# Patient Record
(demographics unavailable — no encounter records)

---

## 2024-10-16 NOTE — ASSESSMENT
[FreeTextEntry1] : Norm is a 15 y/o male here for a follow up visit s/p seen 09/23/24 and starting on Concerta, dx with ADHD, predominantly inattentive presentation 07/11/24. He has a very complex medical history including a rare genetic condition MMA (methylmalonic aciduria), developmental delays, intellectual disability, idiopathic short stature (previously on GH) , s/p liver and kidney transplants in Nov 2019 at [facility]. hypertension (resolved) and diabetes mellitus (steroid induced, resolved). He was cleared for ADHD medication by his nephrologist Dr Dequan Vieira/Audrey Reyes at [facility].  As per specialist recommendation, patient should be followed by their team monthly so that they can appropriately adjust his BP medication if he develops hypertension.

## 2024-10-16 NOTE — DATA REVIEWED
[FreeTextEntry1] : Clearance Letter from 06/27/24 Dr Dequan Vieira/Dr Audrey Reyes [facility]. Patient cleared for all stimulants and ADHD medication.  He is to be followed monthly by their team to monitor his BP and adjust BP meds if he develops hypertension.

## 2024-10-16 NOTE — HISTORY OF PRESENT ILLNESS
[FreeTextEntry1] : Norm is a 15 y/o male here for a follow up visit s/p seen 24 and starting on Concerta, dx with ADHD, predominantly inattentive presentation 24. He has a very complex medical history including a rare genetic condition MMA (methylmalonic aciduria), developmental delays, intellectual disability, idiopathic short stature (previously on GH) , s/p liver and kidney transplants in 2019 at [facility]. hypertension (resolved) and diabetes mellitus (steroid induced, resolved). He was cleared for ADHD medication by his nephrologist Dr Dequan Vieira/Audrey Reyes at [facility].  As per specialist recommendation, patient should be followed by their team monthly so that they can appropriately adjust his BP medication if he develops hypertension.    PLAN FROM PREVIOUS VISIT -Start Concerta 18 mg -Psychoeducation provided re: ADHD.  Resources for education given -Discussed management for ADHD including behavioral interventions and medication options. -Counseling on ADHD medication; stimulants are 1st line treatment. Discussed stimulant medications; risks, benefits, possible side effects. -Reviewed close monitoring for BP and HR changes (mother has medical BP cuff at home and instructions to call nephrologist once monthly and as needed if BP elevated.  -Letter for ADHD medication initiation provided for [facility] specialists.   -Follow up in 1 month (needs ).   INTERIM HPI   HPI FROM VISIT ON 24  Norm is a 15 y/o male here for a follow up visit s/p diagnosed with ADHD, predominantly inattentive presentation 24. He has a very complex medical history including a rare genetic condition MMA (methylmalonic aciduria), developmental delays, intellectual disability, idiopathic short stature (previously on GH) , s/p liver and kidney transplants in 2019 at [facility]. hypertension (resolved) and diabetes mellitus (steroid induced, resolved). Letter was provided for his nephrologist/genetic team at [facility] for clearance for stimulant.  PLAN FROM PREVIOUS VISIT -Medication clearance requested from [facility] team; Letter provided,   -Psychoeducation provided re: ADHD.  Resources for education given -Counseling on ADHD medication; stimulants are 1st line treatment. Discussed stimulant medications; risks, benefits, possible side effects. -Will send Rx for Concerta 18 mg if able to obtain clearance.  -Follow up within 1 month of starting stimulant.   INTERIM HPI  Started 10th grade, self-contained classroom. Letter from [facility] specialist received (scanned to EHR 24) Clearance Letter from 24 Dr Dequan Vieira/Dr Audrey Reyes [facility]. Patient cleared for all stimulants and ADHD medication.  He is to be followed monthly by their team to monitor his BP and adjust BP meds if he develops hypertension.  HPI today: Mother endorses he had recent blood work on 10/17/24.  Discussed BP monitoring needed on stimulant.  Mother reports she had a BP monitor at home that was prescribed by her specialist, and she has been told to call them when BP is increased.     HPI FROM VISIT ON 24  Norm is a 15 y/o male here for a follow up visit s/p diagnosed with ADHD, predominantly inattentive presentation 24.  Medication clearance by specialist requested as he has a very complex medical history including a rare genetic condition MMA (methylmalonic aciduria), developmental delays, intellectual disability, idiopathic short stature (previously on GH) , s/p liver and kidney transplants in 2019 at [facility]. hypertension (resolved) and diabetes mellitus (steroid induced, resolved).   Patient is enrolled in 10th grade (in the fall) in a special education program has an IEP and is receiving OT, PT and ST.     Letter was provided for his nephrologist Dr Corinne Benchimol.    PLAN FROM PREVIOUS VISIT -Medication clearance requested from [facility] team; Letter provided for Dr Melida Lanza.   -Psychoeducation provided re: ADHD.  Resources for education given -Discussed management for ADHD including behavioral interventions and medication options. -Counseling on ADHD medication; stimulants are 1st line treatment. Discussed stimulant medications; risks, benefits, possible side effects. -Follow up in 1 month  INTERIM HPI  services used.   Mother reports she did not get messages left by offices; likely father did not let her know. Norm has a follow up on  at [facility] with his specialist.   She would like to try ADHD medication and asking appropriate questions about dosing, time to give, possible side effects, etc.   He will be starting 10 th grade at [employer]- (summer in a different location)  -Special Ed classroom- IEP, \[employer] [facility]\"" education classroom 10:2:1 -  Classification: Medical conditions, learning disability. - General education classroom - Special Ed/educational services: Currently receiving ST, OT, PT.      HPI FROM VISIT ON 24  Norm is a 15 y/o male here for a follow up ADHD evaluation s/p seen 24 for inattention and behavioral concerns.  He has a very complex medical history including a rare genetic condition MMA (methylmalonic aciduria), developmental delays, intellectual disability, idiopathic short stature (previously on GH) , s/p liver and kidney transplants in 2019 at [facility]. hypertension (resolved) and diabetes mellitus (steroid induced, resolved).   Patient attends 9th grade in a special education program has an IEP and is receiving OT, PT and ST.  On PE he was verbal and cooperative, developmentally delayed, very fidgety and unable to stay seated unless watching a video. Will proceed with ADHD evaluation.  Discussed with mother that ADHD medication, if indicated, would need to be cleared by his [facility] specialists.   PLAN FROM PREVIOUS VISIT -Psychoeducation provided regarding possible ADHD diagnosis. -[place] parent and teacher forms to be completed prior to next visit ([place] Parent form) -Family asked to provide school report cards/progress reports -Family to provide results from previous neuropsychological testing. and current or past IEP forms. -Discussed medication option if indicated, would need to be cleared by [facility] specialists, possible contraindications include liver and kidney transplants.  -Follow up in 1month   INTERIM HPI  Mother cancelled prior follow up visit as he had a class trip.   [place] forms reviewed with  mother.  Mother would like to try ADHD medication.   [place] SCALES-   PARENT ([place] form)-mother Inattention: 8/9+ Hyperactivity: 9/9 + ODD:   6/8 + CD:  0/14- Anxiety/Depression:  4/7+ IMPAIRMENTS (score of 4 or 5)   Academic overall performance: No (score of 3= average)    Reading YES (5= PROBLEMATIC)    Writing YES (4= SOMEWHAT OF A PROBLEM)    Math: YES (4= SOMEWHAT OF A PROBLEM)   Relationships with parents: No (score of 3= average)                                 siblings: No (score of 3= average)                                 peers: No (score of 3= average)                                 organized activities/teams: No (score of 3= average)   TEACHER: Inattention:  8/9 + Hyperactivity:   0/9 - ODD/CD:  0/10 - Anxiety/Depression:  1/7- IMPAIRMENTS Academic & Social Performance in any of the following? 1.Reading YES (5= PROBLEMATIC) 2 Writing YES (5= PROBLEMATIC) 3.Math  YES (4= SOMEWHAT OF A PROBLEM) 4. Relationships with peers No (score of 3= average) 5. Following directions No (score of 3= average) 6. Disrupting class NO (2= ABOVE AVERAGE) 7. Assignment completion YES (4= SOMEWHAT OF A PROBLEM) 8. Organizational skills No (score of 3= average) TEACHER COMMENTS: "NORM HAS A SHORT AND FLEETING ATTENTION SPAN.  HE IS OFTEN FORGETFUL OF DAILY TASKS AT SCHOOL. HIS RETENTION OF INFORMATION IS LIMITED AS WELL.  HIS ACADEMIC STRENGTH IS LIMITED."  Has an appointment on  for blood work, at same office as nephrologist and genetics.   He is in school for the summer.   HPI FROM VISIT ON 24  Norm is a 15 y/o male w here for an initial visit for inattention and behavioral concerns.   Patient lives with parents and attends a special education program at [employer]. He is in , has an IEP, in a special education program, receiving OT, PT and ST.  No counseling.   Mother brings patient in today as Jonathan has genetic condition MMA (methylmalonic aciduria), developmental delays, ID, idiopathic short stature, s/p liver and kidney transplants in 2019 at [facility], hypertension (resolved) and diabetes mellitus (steroid induces, resolved).  He was on GH that stopped 1 year ago.  Mother recalls he had an evaluation at 5 y/o by a neurologist in [place], he had a diagnosis of ID but not attention problems. Over the years, he has been in special education programs and his teachers have always expressed problems staying seated. This is the first year his teachers are reporting he is doing very well, and participates in class. Mother reports at home he can't focus or pay attention, when you try to talk to him, he easily gets distracted, but if redirected or call his name he will listen. At home he struggles to pay attention, to sit and read a book.  He can't sit still, is always moving around, touching things, and everywhere they go he touches every thing.  He can sometimes get upset and wants to hit parents  Mother thinks his teachers may be comparing him to students with more delays than him. Mother describes him as very helpful in the classroom to the children in wheelchairs. There are 10 students and 2 teachers.    Patient appeared friendly, cheerful, immature for age, was verbal and cooperative, developmentally delayed, very fidgety and unable to stay seated unless watching a video.  He spoke fluent [place] and fairly well in English and was able to engage in simple conversations.  .  Current medication (prescription bottles brought by mother)  He is on a low protein diet due to MMA Mycophenolate Mofetil 200 mg/ml 1 ml in AM and   1 ml in PM Tracrolimus 3 ml  of 1 mg/ml Levocarnitine (for MMA) 1 G/10 ml3 ml at noon, and PM. Famotidine Asprin 91 mg Ferrous sulfate Vitamin E Vitamin D 5 ml of 400 IU/ml   DEVELOPMENTAL HX He was born full term by , found on  screening to have genetic condition, was admitted to [facility] for 1 1/2 months.  He had problems with feeding, developmental delays in early infancy.  He walked at 2 years and spoke at 3 1/2 years old.    EDUCATIONAL HISTORY  Elementary School; Special education D[employer]:  Special education D75 in [place]   CURRENT SCHOOL -School: [employer]- (summer in a different location)  -Public school  -Special Ed classroom- [facility], \[facility]\"" education classroom 10:2:1 -  Classification: Medical conditions, learning disability. - General education classroom - Special Ed/educational services: Currently receiving     - PT     -OT     -ST -Academic performance/grades: Not certain, is excused for exams.    RELATIONSHIPS: Gets along well with peers, parents.   EMOTIONAL Denies anxiety or depression   SLEEP Sleeps varies, has good nights, usually sleeps 8 hours, sometimes only 6 hours.   MEDICAL HISTORY: Has history of febrile seizure at 4 years old, not treated with medication.  Denies a history of tics Denies history of starting spells, twitching, seizure-like activity.   FAMILY HISTORY: Family history of ADHD: denies, uncertain Family history of anxiety or depression: denies, mother had a  nephew has schizophrenia Denies family history of cardiac conditions or early unexplained deaths.

## 2024-10-16 NOTE — REASON FOR VISIT
[Home] : at home, [unfilled] , at the time of the visit. [Medical Office: ([facility])___] : at the medical office located in  [Mother] : mother [FreeTextEntry2] : mother [Follow-Up Evaluation] : a follow-up evaluation for [ADHD] : ADHD

## 2024-10-16 NOTE — PLAN
[FreeTextEntry1] : -Concerta 18 mg _____ -Psychoeducation provided re: ADHD.   -Discussed stimulant medications; risks, benefits, possible side effects. -Reviewed close monitoring for BP and HR changes (mother has medical BP cuff at home and instructions to call nephrologist once monthly and as needed if BP elevated.  -Follow up in 1 month (needs ).

## 2024-10-24 NOTE — ASSESSMENT
[FreeTextEntry1] : Norm is a 15 y/o male here for a follow up visit s/p seen 09/23/24 and starting on Concerta, dx with ADHD, predominantly inattentive presentation 07/11/24. He has a very complex medical history including a rare genetic condition MMA (methylmalonic aciduria), developmental delays, intellectual disability, idiopathic short stature (previously on GH) , s/p liver and kidney transplants in Nov 2019 at [facility]. hypertension (resolved) and diabetes mellitus (steroid induced, resolved). He was cleared for ADHD medication by his nephrologist Dr Dequan Vieira/Audrey Reyse at [facility].  As per specialist recommendation, patient should be followed by their team monthly so that they can appropriately adjust his BP medication if he develops hypertension. Concerta 18 mg has had improvement notable at home however due to recent illness with cough has missed many days of school this month.  Has been well tolerated and BP/HR unremarkable. Advised to see PMD for cough and inform specialists at [facility] of BP/HR measurements.

## 2024-10-24 NOTE — PHYSICAL EXAM
[Well-appearing] : well-appearing [Alert] : alert [Gross hearing intact] : gross hearing intact [de-identified] : Smiling, said hello, responds "yes", sucks thumb.

## 2024-10-24 NOTE — HISTORY OF PRESENT ILLNESS
[FreeTextEntry1] : Norm is a 15 y/o male here for a follow up visit s/p seen 24 and starting on Concerta, dx with ADHD, predominantly inattentive presentation 24. He has a very complex medical history including a rare genetic condition MMA (methylmalonic aciduria), developmental delays, intellectual disability, idiopathic short stature (previously on GH) , s/p liver and kidney transplants in 2019 at [facility] [facility]. hypertension (resolved) and diabetes mellitus (steroid induced, resolved). He was cleared for ADHD medication by his nephrologist Dr Dequan Vieira/Audrey Reyes at [facility].  As per specialist recommendation, patient should be followed by their team monthly so that they can appropriately adjust his BP medication if he develops hypertension.    PLAN FROM PREVIOUS VISIT -Start Concerta 18 mg -Psychoeducation provided re: ADHD.  Resources for education given -Discussed management for ADHD including behavioral interventions and medication options. -Counseling on ADHD medication; stimulants are 1st line treatment. Discussed stimulant medications; risks, benefits, possible side effects. -Reviewed close monitoring for BP and HR changes (mother has medical BP cuff at home and instructions to call nephrologist once monthly and as needed if BP elevated.  -Letter for ADHD medication initiation provided for [facility] specialists.   -Follow up in 1 month (needs ).   INTERIM HPI   HPI FROM VISIT ON 24  Norm is a 15 y/o male here for a follow up visit s/p diagnosed with ADHD, predominantly inattentive presentation 24. He has a very complex medical history including a rare genetic condition MMA (methylmalonic aciduria), developmental delays, intellectual disability, idiopathic short stature (previously on GH) , s/p liver and kidney transplants in 2019 at [facility]. hypertension (resolved) and diabetes mellitus (steroid induced, resolved). Letter was provided for his nephrologist/genetic team at [facility] for clearance for stimulant.  PLAN FROM PREVIOUS VISIT -Medication clearance requested from [facility] team; Letter provided,   -Psychoeducation provided re: ADHD.  Resources for education given -Counseling on ADHD medication; stimulants are 1st line treatment. Discussed stimulant medications; risks, benefits, possible side effects. -Will send Rx for Concerta 18 mg if able to obtain clearance.  -Follow up within 1 month of starting stimulant.   INTERIM HPI  Started 10th grade, self-contained classroom. Letter from [facility] specialist received (scanned to EHR 24) Clearance Letter from 24 Dr Dequan Vieira/Dr Audrey Reyes [facility]. Patient cleared for all stimulants and ADHD medication.  He is to be followed monthly by their team to monitor his BP and adjust BP meds if he develops hypertension.  HPI today: Mother endorses he had recent blood work on 10/17/24.  Discussed BP monitoring needed on stimulant.  Mother reports she had a BP monitor at home that was prescribed by her specialist, and she has been told to call them when BP is increased.    services used Mother feels he is tolerating Concerta very well, and seems calmer, and less fidgety on Concerta.  He didn't take last weekend.  Teachers have not reported anything however he hasn't been in school for a full week due to a bad cough going around the family.  Mother was coughing often during visit, Norm not coughing however mother reports he coughs a lot a night.  Has not been checking his blood pressure however check BP while on visit. BP=94/54 (needed to retake 3 times as patient moving) HP=90   HPI FROM VISIT ON 24  Norm is a 15 y/o male here for a follow up visit s/p diagnosed with ADHD, predominantly inattentive presentation 24.  Medication clearance by specialist requested as he has a very complex medical history including a rare genetic condition MMA (methylmalonic aciduria), developmental delays, intellectual disability, idiopathic short stature (previously on GH) , s/p liver and kidney transplants in 2019 at [facility]. hypertension (resolved) and diabetes mellitus (steroid induced, resolved).   Patient is enrolled in 10th grade (in the fall) in a special education program has an IEP and is receiving OT, PT and ST.     Letter was provided for his nephrologist Dr Corinne Benchimol.    PLAN FROM PREVIOUS VISIT -Medication clearance requested from [facility] team; Letter provided for Dr Melida Lanza.   -Psychoeducation provided re: ADHD.  Resources for education given -Discussed management for ADHD including behavioral interventions and medication options. -Counseling on ADHD medication; stimulants are 1st line treatment. Discussed stimulant medications; risks, benefits, possible side effects. -Follow up in 1 month  INTERIM HPI  services used.   Mother reports she did not get messages left by offices; likely father did not let her know. Norm has a follow up on  at [facility] with his specialist.   She would like to try ADHD medication and asking appropriate questions about dosing, time to give, possible side effects, etc.   He will be starting 10 th grade at [employer]- (summer in a different location)  -Special Ed classroom- IEP, [facility] education classroom 10:2:1 -  Classification: Medical conditions, learning disability. - General education classroom - Special Ed/educational services: Currently receiving ST, OT, PT.      HPI FROM VISIT ON 24  Norm is a 15 y/o male here for a follow up ADHD evaluation s/p seen 24 for inattention and behavioral concerns.  He has a very complex medical history including a rare genetic condition MMA (methylmalonic aciduria), developmental delays, intellectual disability, idiopathic short stature (previously on GH) , s/p liver and kidney transplants in 2019 at [facility]. hypertension (resolved) and diabetes mellitus (steroid induced, resolved).   Patient attends 9th grade in a special education program has an IEP and is receiving OT, PT and ST.  On PE he was verbal and cooperative, developmentally delayed, very fidgety and unable to stay seated unless watching a video. Will proceed with ADHD evaluation.  Discussed with mother that ADHD medication, if indicated, would need to be cleared by his [facility] specialists.   PLAN FROM PREVIOUS VISIT -Psychoeducation provided regarding possible ADHD diagnosis. -[place] parent and teacher forms to be completed prior to next visit ([place] Parent form) -Family asked to provide school report cards/progress reports -Family to provide results from previous neuropsychological testing. and current or past IEP forms. -Discussed medication option if indicated, would need to be cleared by [facility] specialists, possible contraindications include liver and kidney transplants.  -Follow up in 1month   INTERIM HPI  Mother cancelled prior follow up visit as he had a class trip.   Deovn forms reviewed with  mother.  Mother would like to try ADHD medication.   [employer] SCALES-   PARENT ([place]inese form)-mother Inattention: 8/9+ Hyperactivity: 9/9 + ODD:   6/8 + CD:  0/14- Anxiety/Depression:  4/7+ IMPAIRMENTS (score of 4 or 5)   Academic overall performance: No (score of 3= average)    Reading YES (5= PROBLEMATIC)    Writing YES (4= SOMEWHAT OF A PROBLEM)    Math: YES (4= SOMEWHAT OF A PROBLEM)   Relationships with parents: No (score of 3= average)                                 siblings: No (score of 3= average)                                 peers: No (score of 3= average)                                 organized activities/teams: No (score of 3= average)   TEACHER: Inattention:  8/ + Hyperactivity:   0/9 - ODD/CD:  0/10 - Anxiety/Depression:  1- IMPAIRMENTS Academic & Social Performance in any of the following? 1.Reading YES (5= PROBLEMATIC) 2 Writing YES (5= PROBLEMATIC) 3.Math  YES (4= SOMEWHAT OF A PROBLEM) 4. Relationships with peers No (score of 3= average) 5. Following directions No (score of 3= average) 6. Disrupting class NO (2= ABOVE AVERAGE) 7. Assignment completion YES (4= SOMEWHAT OF A PROBLEM) 8. Organizational skills No (score of 3= average) TEACHER COMMENTS: "NORM HAS A SHORT AND FLEETING ATTENTION SPAN.  HE IS OFTEN FORGETFUL OF DAILY TASKS AT SCHOOL. HIS RETENTION OF INFORMATION IS LIMITED AS WELL.  HIS ACADEMIC STRENGTH IS LIMITED."  Has an appointment on  for blood work, at same office as nephrologist and genetics.   He is in school for the summer.   HPI FROM VISIT ON 24  Norm is a 15 y/o male w here for an initial visit for inattention and behavioral concerns.   Patient lives with parents and attends a special education program at [employer]. He is in , has an IEP, in a special education program, receiving OT, PT and ST.  No counseling.   Mother brings patient in today as Jonathan has genetic condition MMA (methylmalonic aciduria), developmental delays, ID, idiopathic short stature, s/p liver and kidney transplants in 2019 at [facility], hypertension (resolved) and diabetes mellitus (steroid induces, resolved).  He was on GH that stopped 1 year ago.  Mother recalls he had an evaluation at 5 y/o by [facility] neurologist [facility], he had a diagnosis of ID but not attention problems. Over the years, he has been in special education programs and his teachers have always expressed problems staying seated. This is the first year his teachers are reporting he is doing very well, and participates in class. Mother reports at home he can't focus or pay attention, when you try to talk to him, he easily gets distracted, but if redirected or call his name he will listen. At home he struggles to pay attention, to sit and read a book.  He can't sit still, is always moving around, touching things, and everywhere they go he touches every thing.  He can sometimes get upset and wants to hit parents  Mother thinks his teachers may be comparing him to students with more delays than him. Mother describes him as very helpful in the classroom to the children in wheelchairs. There are 10 students and 2 teachers.    Patient appeared friendly, cheerful, immature for age, was verbal and cooperative, developmentally delayed, very fidgety and unable to stay seated unless watching a video.  He spoke fluent Beninese and fairly well in English and was able to engage in simple conversations.  .  Current medication (prescription bottles brought by mother)  He is on a low protein diet due to MMA Mycophenolate Mofetil 200 mg/ml 1 ml in AM and   1 ml in PM Tracrolimus 3 ml  of 1 mg/ml Levocarnitine (for MMA) 1 G/10 ml3 ml at noon, and PM. Famotidine Asprin 91 mg Ferrous sulfate Vitamin E Vitamin D 5 ml of 400 IU/ml   DEVELOPMENTAL HX He was born full term by , found on  screening to have genetic condition, was admitted to [facility] for 1 1/2 months.  He had problems with feeding, developmental delays in early infancy.  He walked at 2 years and spoke at 3 1/2 years old.    EDUCATIONAL HISTORY  Elementary School; Special education D[employer]:  Special education D75 in [place]   CURRENT SCHOOL -School: [employer]- (summer in a different location)  -Public school  -Special Ed classroom- IE, pecial education classroom 10:2:1 -  Classification: Medical conditions, learning disability. - General education classroom - Special Ed/educational services: Currently receiving     - PT     -OT     -ST -Academic performance/grades: Not certain, is excused for exams.    RELATIONSHIPS: Gets along well with peers, parents.   EMOTIONAL Denies anxiety or depression   SLEEP Sleeps varies, has good nights, usually sleeps 8 hours, sometimes only 6 hours.   MEDICAL HISTORY: Has history of febrile seizure at 4 years old, not treated with medication.  Denies a history of tics Denies history of starting spells, twitching, seizure-like activity.   FAMILY HISTORY: Family history of ADHD: denies, uncertain Family history of anxiety or depression: denies, mother had a  nephew has schizophrenia Denies family history of cardiac conditions or early unexplained deaths.

## 2024-10-24 NOTE — DATA REVIEWED
[FreeTextEntry1] : Clearance Letter from 06/27/24 Dr Dequan Vieira/Dr Audrey Reyes [facility]. Patient cleared for all stimulants and ADHD medication.  He is to be followed monthly by their team to monitor his BP and adjust BP meds if he develops hypertension.

## 2024-10-24 NOTE — ASSESSMENT
[FreeTextEntry1] : Norm is a 15 y/o male here for a follow up visit s/p seen 09/23/24 and starting on Concerta, dx with ADHD, predominantly inattentive presentation 07/11/24. He has a very complex medical history including a rare genetic condition MMA (methylmalonic aciduria), developmental delays, intellectual disability, idiopathic short stature (previously on GH) , s/p liver and kidney transplants in Nov 2019 at [place]. hypertension (resolved) and diabetes mellitus (steroid induced, resolved). He was cleared for ADHD medication by his nephrologist Dr Dequan Vieira/Audrey Reyes at [facility].  As per specialist recommendation, patient should be followed by their team monthly so that they can appropriately adjust his BP medication if he develops hypertension. Concerta 18 mg has had improvement notable at home however due to recent illness with cough has missed many days of school this month.  Has been well tolerated and BP/HR unremarkable. Advised to see PMD for cough and inform specialists at [facility] of BP/HR measurements.

## 2024-10-24 NOTE — DATA REVIEWED
[FreeTextEntry1] : Clearance Letter from 06/27/24 Dr Dequan Vieira/Dr Audrey Reyes [facility]. Patient cleared for all stimulants and ADHD medication.  He is to be followed monthly by their team to monitor his BP and adjust BP meds if he develops hypertension.

## 2024-10-24 NOTE — HISTORY OF PRESENT ILLNESS
[FreeTextEntry1] : Norm is a 15 y/o male here for a follow up visit s/p seen 24 and starting on Concerta, dx with ADHD, predominantly inattentive presentation 24. He has a very complex medical history including a rare genetic condition MMA (methylmalonic aciduria), developmental delays, intellectual disability, idiopathic short stature (previously on GH) , s/p liver and kidney transplants in 2019 at [place]. hypertension (resolved) and diabetes mellitus (steroid induced, resolved). He was cleared for ADHD medication by his nephrologist Dr Dequan Vieira/Audrey Reyes at [facility].  As per specialist recommendation, patient should be followed by their team monthly so that they can appropriately adjust his BP medication if he develops hypertension.    PLAN FROM PREVIOUS VISIT -Start Concerta 18 mg -Psychoeducation provided re: ADHD.  Resources for education given -Discussed management for ADHD including behavioral interventions and medication options. -Counseling on ADHD medication; stimulants are 1st line treatment. Discussed stimulant medications; risks, benefits, possible side effects. -Reviewed close monitoring for BP and HR changes (mother has medical BP cuff at home and instructions to call nephrologist once monthly and as needed if BP elevated.  -Letter for ADHD medication initiation provided for [place] specialists.   -Follow up in 1 month (needs ).   INTERIM HPI   HPI FROM VISIT ON 24  Norm is a 15 y/o male here for a follow up visit s/p diagnosed with ADHD, predominantly inattentive presentation 24. He has a very complex medical history including a rare genetic condition MMA (methylmalonic aciduria), developmental delays, intellectual disability, idiopathic short stature (previously on GH) , s/p liver and kidney transplants in 2019 at [place]. hypertension (resolved) and diabetes mellitus (steroid induced, resolved). Letter was provided for his nephrologist/genetic team at [place] for clearance for stimulant.  PLAN FROM PREVIOUS VISIT -Medication clearance requested from [place] team; Letter provided,   -Psychoeducation provided re: ADHD.  Resources for education given -Counseling on ADHD medication; stimulants are 1st line treatment. Discussed stimulant medications; risks, benefits, possible side effects. -Will send Rx for Concerta 18 mg if able to obtain clearance.  -Follow up within 1 month of starting stimulant.   INTERIM HPI  Started 10th grade, self-contained classroom. Letter from [place] specialist received (scanned to EHR 24) Clearance Letter from 24 Dr Dequan Vieira/Dr Audrey Reyes [facility]. Patient cleared for all stimulants and ADHD medication.  He is to be followed monthly by their team to monitor his BP and adjust BP meds if he develops hypertension.  HPI today: Mother endorses he had recent blood work on 10/17/24.  Discussed BP monitoring needed on stimulant.  Mother reports she had a BP monitor at home that was prescribed by her specialist, and she has been told to call them when BP is increased.    services used Mother feels he is tolerating Concerta very well, and seems calmer, and less fidgety on Concerta.  He didn't take last weekend.  Teachers have not reported anything however he hasn't been in school for a full week due to a bad cough going around the family.  Mother was coughing often during visit, Norm not coughing however mother reports he coughs a lot a night.  Has not been checking his blood pressure however check BP while on visit. BP=94/54 (needed to retake 3 times as patient moving) HP=90   HPI FROM VISIT ON 24  Norm is a 15 y/o male here for a follow up visit s/p diagnosed with ADHD, predominantly inattentive presentation 24.  Medication clearance by specialist requested as he has a very complex medical history including a rare genetic condition MMA (methylmalonic aciduria), developmental delays, intellectual disability, idiopathic short stature (previously on GH) , s/p liver and kidney transplants in 2019 at [place]. hypertension (resolved) and diabetes mellitus (steroid induced, resolved).   Patient is enrolled in 10th grade (in the fall) in a special education program has an IEP and is receiving OT, PT and ST.     Letter was provided for his nephrologist Dr Corinne Benchimol.    PLAN FROM PREVIOUS VISIT -Medication clearance requested from [place] team; Letter provided for Dr Melida Lanza.   -Psychoeducation provided re: ADHD.  Resources for education given -Discussed management for ADHD including behavioral interventions and medication options. -Counseling on ADHD medication; stimulants are 1st line treatment. Discussed stimulant medications; risks, benefits, possible side effects. -Follow up in 1 month  INTERIM HPI  services used.   Mother reports she did not get messages left by offices; likely father did not let her know. Norm has a follow up on  at [place] with his specialist.   She would like to try ADHD medication and asking appropriate questions about dosing, time to give, possible side effects, etc.   He will be starting 10 th grade at [employer]- (summer in a different location)  -Special Ed classroom- IEP, [facility] education classroom 10:2:1 -  Classification: Medical conditions, learning disability. - General education classroom - Special Ed/educational services: Currently receiving ST, OT, PT.      HPI FROM VISIT ON 24  Norm is a 15 y/o male here for a follow up ADHD evaluation s/p seen 24 for inattention and behavioral concerns.  He has a very complex medical history including a rare genetic condition MMA (methylmalonic aciduria), developmental delays, intellectual disability, idiopathic short stature (previously on GH) , s/p liver and kidney transplants in 2019 at [place]. hypertension (resolved) and diabetes mellitus (steroid induced, resolved).   Patient attends 9th grade in a special education program has an IEP and is receiving OT, PT and ST.  On PE he was verbal and cooperative, developmentally delayed, very fidgety and unable to stay seated unless watching a video. Will proceed with ADHD evaluation.  Discussed with mother that ADHD medication, if indicated, would need to be cleared by his [place] specialists.   PLAN FROM PREVIOUS VISIT -Psychoeducation provided regarding possible ADHD diagnosis. -[place] parent and teacher forms to be completed prior to next visit ([place] citizen Parent form) -Family asked to provide school report cards/progress reports -Family to provide results from previous neuropsychological testing. and current or past IEP forms. -Discussed medication option if indicated, would need to be cleared by [place] specialists, possible contraindications include liver and kidney transplants.  -Follow up in 1month   INTERIM HPI  Mother cancelled prior follow up visit as he had a class trip.   [place] forms reviewed with  mother.  Mother would like to try ADHD medication.   [place] SCALES-   PARENT ([place] citizen form)-mother Inattention: 8/9+ Hyperactivity: 9/9 + ODD:   6/8 + CD:  0/14- Anxiety/Depression:  4/7+ IMPAIRMENTS (score of 4 or 5)   Academic overall performance: No (score of 3= average)    Reading YES (5= PROBLEMATIC)    Writing YES (4= SOMEWHAT OF A PROBLEM)    Math: YES (4= SOMEWHAT OF A PROBLEM)   Relationships with parents: No (score of 3= average)                                 siblings: No (score of 3= average)                                 peers: No (score of 3= average)                                 organized activities/teams: No (score of 3= average)   TEACHER: Inattention:  8/ + Hyperactivity:   0/9 - ODD/CD:  0/10 - Anxiety/Depression:  1- IMPAIRMENTS Academic & Social Performance in any of the following? 1.Reading YES (5= PROBLEMATIC) 2 Writing YES (5= PROBLEMATIC) 3.Math  YES (4= SOMEWHAT OF A PROBLEM) 4. Relationships with peers No (score of 3= average) 5. Following directions No (score of 3= average) 6. Disrupting class NO (2= ABOVE AVERAGE) 7. Assignment completion YES (4= SOMEWHAT OF A PROBLEM) 8. Organizational skills No (score of 3= average) TEACHER COMMENTS: "NORM HAS A SHORT AND FLEETING ATTENTION SPAN.  HE IS OFTEN FORGETFUL OF DAILY TASKS AT SCHOOL. HIS RETENTION OF INFORMATION IS LIMITED AS WELL.  HIS ACADEMIC STRENGTH IS LIMITED."  Has an appointment on  for blood work, at same office as nephrologist and genetics.   He is in school for the summer.   HPI FROM VISIT ON 24  Norm is a 15 y/o male w here for an initial visit for inattention and behavioral concerns.   Patient lives with parents and attends a special education program at [employer]. He is in , has an IEP, in a special education program, receiving OT, PT and ST.  No counseling.   Mother brings patient in today as Jonathan has genetic condition MMA (methylmalonic aciduria), developmental delays, ID, idiopathic short stature, s/p liver and kidney transplants in 2019 at [place], hypertension (resolved) and diabetes mellitus (steroid induces, resolved).  He was on GH that stopped 1 year ago.  Mother recalls he [facility] at 7 y/[facility], he had a diagnosis of ID but not attention problems. Over the years, he has been in special education programs and his teachers have always expressed problems staying seated. This is the first year his teachers are reporting he is doing very well, and participates in class. Mother reports at home he can't focus or pay attention, when you try to talk to him, he easily gets distracted, but if redirected or call his name he will listen. At home he struggles to pay attention, to sit and read a book.  He can't sit still, is always moving around, touching things, and everywhere they go he touches every thing.  He can sometimes get upset and wants to hit parents  Mother thinks his teachers may be comparing him to students with more delays than him. Mother describes him as very helpful in the classroom to the children in wheelchairs. There are 10 students and 2 teachers.    Patient appeared friendly, cheerful, immature for age, was verbal and cooperative, developmentally delayed, very fidgety and unable to stay seated unless watching a video.  He spoke fluent [place] citizen and fairly well in English and was able to engage in simple conversations.  .  Current medication (prescription bottles brought by mother)  He is on a low protein diet due to MMA Mycophenolate Mofetil 200 mg/ml 1 ml in AM and   1 ml in PM Tracrolimus 3 ml  of 1 mg/ml Levocarnitine (for MMA) 1 G/10 ml3 ml at noon, and PM. Famotidine Asprin 91 mg Ferrous sulfate Vitamin E Vitamin D 5 ml of 400 IU/ml   DEVELOPMENTAL HX He was born full term by , found on  screening to have genetic condition, was admitted to [place] for 1 1/2 months.  He had problems with feeding, developmental delays in early infancy.  He walked at 2 years and spoke at 3 1/2 years old.    EDUCATIONAL HISTORY  Elementary School; Special education [employer]:  Special education D75 in [place]   CURRENT SCHOOL -School: [employer]- (summer in a different location)  -Public school  -Special Ed classroom- IE, pecial education classroom 10:2:1 -  Classification: Medical conditions, learning disability. - General education classroom - Special Ed/educational services: Currently receiving     - PT     -OT     -ST -Academic performance/grades: Not certain, is excused for exams.    RELATIONSHIPS: Gets along well with peers, parents.   EMOTIONAL Denies anxiety or depression   SLEEP Sleeps varies, has good nights, usually sleeps 8 hours, sometimes only 6 hours.   MEDICAL HISTORY: Has history of febrile seizure at 4 years old, not treated with medication.  Denies a history of tics Denies history of starting spells, twitching, seizure-like activity.   FAMILY HISTORY: Family history of ADHD: denies, uncertain Family history of anxiety or depression: denies, mother had a  nephew has schizophrenia Denies family history of cardiac conditions or early unexplained deaths.

## 2024-10-24 NOTE — PLAN
[FreeTextEntry1] : -Continue Concerta 18 mg once daily in the morning. -Discussed stimulant medications; risks, benefits, possible side effects. -Continue monitoring for BP and HR changes (mother has medical BP cuff at home and instructions to call nephrologist once monthly and as needed if BP elevated.  -Referred to be evaluated by PMD for cough. -Follow up in 1 month (needs ).

## 2024-10-24 NOTE — REASON FOR VISIT
[Mother] : mother [FreeTextEntry2] : mother [Time Spent: ____ minutes] : Total time spent using  services: [unfilled] minutes. The patient's primary language is not English thus required  services. [Interpreters_IDNumber] : [id number] [Interpreters_FullName] : Theresa [TWNoteComboBox1] : [place]

## 2024-10-24 NOTE — PHYSICAL EXAM
[Well-appearing] : well-appearing [Alert] : alert [Gross hearing intact] : gross hearing intact [de-identified] : Smiling, said hello, responds "yes", sucks thumb.

## 2024-10-24 NOTE — REASON FOR VISIT
[Mother] : mother [FreeTextEntry2] : mother [Time Spent: ____ minutes] : Total time spent using  services: [unfilled] minutes. The patient's primary language is not English thus required  services. [Interpreters_IDNumber] : [id number] [Interpreters_FullName] : Theresa [TWNoteComboBox1] : [place]